# Patient Record
Sex: MALE | Race: WHITE | NOT HISPANIC OR LATINO | URBAN - METROPOLITAN AREA
[De-identification: names, ages, dates, MRNs, and addresses within clinical notes are randomized per-mention and may not be internally consistent; named-entity substitution may affect disease eponyms.]

---

## 2021-05-04 ENCOUNTER — APPOINTMENT (RX ONLY)
Dept: URBAN - METROPOLITAN AREA CLINIC 322 | Facility: CLINIC | Age: 46
Setting detail: DERMATOLOGY
End: 2021-05-04

## 2021-05-04 VITALS — TEMPERATURE: 98 F

## 2021-05-04 DIAGNOSIS — L81.4 OTHER MELANIN HYPERPIGMENTATION: ICD-10-CM

## 2021-05-04 DIAGNOSIS — D22 MELANOCYTIC NEVI: ICD-10-CM

## 2021-05-04 PROBLEM — D239 BENIGN NEOPLASM OF SKIN, SITE UNSPECIFIED: Status: ACTIVE | Noted: 2021-05-04

## 2021-05-04 PROBLEM — D22.5 MELANOCYTIC NEVI OF TRUNK: Status: ACTIVE | Noted: 2021-05-04

## 2021-05-04 PROCEDURE — ? TREATMENT REGIMEN

## 2021-05-04 PROCEDURE — ? COUNSELING

## 2021-05-04 PROCEDURE — ? FULL BODY SKIN EXAM

## 2021-05-04 PROCEDURE — 99203 OFFICE O/P NEW LOW 30 MIN: CPT

## 2021-05-04 ASSESSMENT — LOCATION SIMPLE DESCRIPTION DERM
LOCATION SIMPLE: LEFT UPPER BACK
LOCATION SIMPLE: LEFT UPPER BACK
LOCATION SIMPLE: UPPER BACK

## 2021-05-04 ASSESSMENT — LOCATION ZONE DERM
LOCATION ZONE: TRUNK
LOCATION ZONE: TRUNK

## 2021-05-04 ASSESSMENT — LOCATION DETAILED DESCRIPTION DERM
LOCATION DETAILED: LEFT MEDIAL UPPER BACK
LOCATION DETAILED: INFERIOR THORACIC SPINE
LOCATION DETAILED: LEFT INFERIOR MEDIAL UPPER BACK

## 2023-09-20 ENCOUNTER — APPOINTMENT (RX ONLY)
Dept: URBAN - METROPOLITAN AREA CLINIC 322 | Facility: CLINIC | Age: 48
Setting detail: DERMATOLOGY
End: 2023-09-20

## 2023-09-20 DIAGNOSIS — D22 MELANOCYTIC NEVI: ICD-10-CM

## 2023-09-20 DIAGNOSIS — L82.1 OTHER SEBORRHEIC KERATOSIS: ICD-10-CM

## 2023-09-20 DIAGNOSIS — L81.4 OTHER MELANIN HYPERPIGMENTATION: ICD-10-CM

## 2023-09-20 PROBLEM — D22.5 MELANOCYTIC NEVI OF TRUNK: Status: ACTIVE | Noted: 2023-09-20

## 2023-09-20 PROCEDURE — ? COUNSELING

## 2023-09-20 PROCEDURE — ? TREATMENT REGIMEN

## 2023-09-20 PROCEDURE — 99213 OFFICE O/P EST LOW 20 MIN: CPT

## 2023-09-20 PROCEDURE — ? FULL BODY SKIN EXAM

## 2023-09-20 ASSESSMENT — LOCATION DETAILED DESCRIPTION DERM
LOCATION DETAILED: LEFT CENTRAL MALAR CHEEK
LOCATION DETAILED: LEFT VENTRAL PROXIMAL FOREARM
LOCATION DETAILED: RIGHT VENTRAL PROXIMAL FOREARM
LOCATION DETAILED: EPIGASTRIC SKIN
LOCATION DETAILED: PERIUMBILICAL SKIN

## 2023-09-20 ASSESSMENT — LOCATION ZONE DERM
LOCATION ZONE: FACE
LOCATION ZONE: ARM
LOCATION ZONE: TRUNK

## 2023-09-20 ASSESSMENT — LOCATION SIMPLE DESCRIPTION DERM
LOCATION SIMPLE: LEFT FOREARM
LOCATION SIMPLE: LEFT CHEEK
LOCATION SIMPLE: RIGHT FOREARM
LOCATION SIMPLE: ABDOMEN

## 2023-09-20 NOTE — PROCEDURE: COUNSELING
Detail Level: Generalized
Detail Level: Zone
Patient Specific Counseling (Will Not Stick From Patient To Patient): After discussing risks of LN2, provided complimentary Ln2 to few lesions on face and chest